# Patient Record
Sex: MALE | Race: WHITE | Employment: FULL TIME | URBAN - METROPOLITAN AREA
[De-identification: names, ages, dates, MRNs, and addresses within clinical notes are randomized per-mention and may not be internally consistent; named-entity substitution may affect disease eponyms.]

---

## 2020-02-27 ENCOUNTER — EMPLOYEE HEALTH (OUTPATIENT)
Dept: OTHER | Facility: HOSPITAL | Age: 35
End: 2020-02-27
Attending: PREVENTIVE MEDICINE

## 2020-02-27 DIAGNOSIS — Z01.84 IMMUNITY STATUS TESTING: ICD-10-CM

## 2020-02-27 DIAGNOSIS — Z11.1 SCREENING-PULMONARY TB: Primary | ICD-10-CM

## 2020-02-27 LAB
RUBV IGG SER QL: POSITIVE
RUBV IGG SER-ACNC: 121.6 IU/ML (ref 10–?)

## 2020-02-27 PROCEDURE — 86787 VARICELLA-ZOSTER ANTIBODY: CPT

## 2020-02-27 PROCEDURE — 86480 TB TEST CELL IMMUN MEASURE: CPT

## 2020-02-27 PROCEDURE — 86765 RUBEOLA ANTIBODY: CPT

## 2020-02-27 PROCEDURE — 86735 MUMPS ANTIBODY: CPT

## 2020-02-27 PROCEDURE — 86762 RUBELLA ANTIBODY: CPT

## 2020-02-28 LAB
MEV IGG SER-ACNC: >300 AU/ML (ref 16.5–?)
MUV IGG SER IA-ACNC: 291 AU/ML (ref 11–?)
VZV IGG SER IA-ACNC: 1575 (ref 165–?)

## 2020-03-02 LAB
M TB TUBERC IFN-G BLD QL: NEGATIVE
M TB TUBERC IFN-G/MITOGEN IGNF BLD: 0 IU/ML
M TB TUBERC IFN-G/MITOGEN IGNF BLD: <0 IU/ML
M TB TUBERC IGNF/MITOGEN IGNF CONTROL: 9.05 IU/ML
MITOGEN IGNF BCKGRD COR BLD-ACNC: 0.02 IU/ML

## 2020-12-18 ENCOUNTER — IMMUNIZATION (OUTPATIENT)
Dept: LAB | Facility: HOSPITAL | Age: 35
End: 2020-12-18
Attending: PREVENTIVE MEDICINE

## 2020-12-18 DIAGNOSIS — Z23 NEED FOR VACCINATION: ICD-10-CM

## 2020-12-18 PROCEDURE — 0001A PFIZER-BIONTECH COVID-19 VACCINE: CPT

## 2021-01-08 ENCOUNTER — IMMUNIZATION (OUTPATIENT)
Dept: LAB | Facility: HOSPITAL | Age: 36
End: 2021-01-08
Attending: PREVENTIVE MEDICINE

## 2021-01-08 DIAGNOSIS — Z23 NEED FOR VACCINATION: ICD-10-CM

## 2021-01-08 PROCEDURE — 0002A SARSCOV2 VAC 30MCG/0.3ML IM: CPT

## 2021-02-26 ENCOUNTER — OFFICE VISIT (OUTPATIENT)
Dept: FAMILY MEDICINE CLINIC | Facility: CLINIC | Age: 36
End: 2021-02-26
Payer: COMMERCIAL

## 2021-02-26 VITALS
WEIGHT: 182 LBS | TEMPERATURE: 99 F | HEART RATE: 66 BPM | HEIGHT: 70 IN | DIASTOLIC BLOOD PRESSURE: 69 MMHG | SYSTOLIC BLOOD PRESSURE: 124 MMHG | BODY MASS INDEX: 26.05 KG/M2

## 2021-02-26 DIAGNOSIS — L98.9 SKIN LESION: ICD-10-CM

## 2021-02-26 DIAGNOSIS — Z00.00 ANNUAL PHYSICAL EXAM: Primary | ICD-10-CM

## 2021-02-26 PROCEDURE — 3074F SYST BP LT 130 MM HG: CPT | Performed by: FAMILY MEDICINE

## 2021-02-26 PROCEDURE — 99385 PREV VISIT NEW AGE 18-39: CPT | Performed by: FAMILY MEDICINE

## 2021-02-26 PROCEDURE — 3078F DIAST BP <80 MM HG: CPT | Performed by: FAMILY MEDICINE

## 2021-02-26 PROCEDURE — 3008F BODY MASS INDEX DOCD: CPT | Performed by: FAMILY MEDICINE

## 2021-02-26 NOTE — PROGRESS NOTES
Patricia Little is a 28year old male who presents for a complete physical exam.   HPI:   Work: Works as an  of dept at [employer].   Social: Lives with wife and 3 children  Diet: eats home cooked meals   Exercise: mainly sedentary at th denies headaches  PSYCHE: denies depression or anxiety  HEMATOLOGIC: denies hx of anemia, denies bruising  ENDOCRINE: denies weight changes  ALL/ASTHMA: denies hx of allergy or asthma    EXAM:   /69 (BP Location: Right arm, Patient Position: Sitting,

## 2021-10-15 ENCOUNTER — IMMUNIZATION (OUTPATIENT)
Dept: LAB | Facility: HOSPITAL | Age: 36
End: 2021-10-15
Attending: EMERGENCY MEDICINE

## 2021-10-15 DIAGNOSIS — Z23 NEED FOR VACCINATION: Primary | ICD-10-CM

## 2021-10-15 PROCEDURE — 0003A SARSCOV2 VAC 30MCG/0.3ML IM: CPT

## 2021-10-15 PROCEDURE — 0004A SARSCOV2 VAC 30MCG/0.3ML IM: CPT

## 2021-12-28 ENCOUNTER — TELEPHONE (OUTPATIENT)
Dept: INTERNAL MEDICINE CLINIC | Facility: HOSPITAL | Age: 36
End: 2021-12-28

## 2021-12-28 DIAGNOSIS — Z20.822 EXPOSURE TO CONFIRMED CASE OF COVID-19: Primary | ICD-10-CM

## 2021-12-28 NOTE — TELEPHONE ENCOUNTER
Department: PAM                                 [] [facility]  []SKY   [x] [address] [place] [address]    Dept Manager/Supervisor/team or clinical lead: Jojo Sloan    Position:  [] MD     [] RN     [] Respiratory Therapist     [] PCT     [] PSR      [] Angel Barnes     [] MA [x] Other; System location:     What shift do you work? Days  When was the last shift you worked?  12/28/21  When are you next scheduled to work? 12/29/21    Did you have close contact with someone on your unit while not wearing a mask? (e.g., during meal breaks):  Yes [] days.  Test w/ [facility] 3-5 days after exposure.                                            NOTES:                 [facility] testing ordered: [] Rapid    [x] [facility]    Date test is to be taken:    12/30/21    []  Outside testing       [x] Manager notified

## 2022-01-22 NOTE — TELEPHONE ENCOUNTER
Brett ordered on 12/28/21 not yet completed, sent message to take test or send in results if outside testing was completed instead.

## 2022-01-23 NOTE — TELEPHONE ENCOUNTER
Emp and manager were sent an e-mail requesting the outside result, IF outside testing was done., Emp was also called, no answer at [phone], left vm to call us back and message including an outside result if one was done.  Number and hours of operation

## 2022-09-19 ENCOUNTER — TELEPHONE (OUTPATIENT)
Dept: FAMILY MEDICINE CLINIC | Facility: CLINIC | Age: 37
End: 2022-09-19

## 2022-09-19 NOTE — TELEPHONE ENCOUNTER
Received call from [address] [place] [address] with [employer] [facility] requesting medical records information. Call transferred.

## (undated) NOTE — LETTER
09/30/21        [place]  [phone]      Dear Curtistine Hammans,    [facility] records indicate that you have outstanding lab work and or testing that was ordered for you and has not yet been completed:  Orders Placed This Encoun

## (undated) NOTE — LETTER
06/29/21        [address] [address]      Dear Aurea Wilkinson,    [id number] records indicate that you have outstanding lab work and or testing that was ordered for you and has not yet been completed:  Orders Placed This Encoun

## (undated) NOTE — LETTER
03/29/21        [address] [phone]      Dear Trena Parsons,    [facility] records indicate that you have outstanding lab work and or testing that was ordered for you and has not yet been completed:  Orders Placed This Encoun